# Patient Record
Sex: FEMALE | Race: WHITE | HISPANIC OR LATINO | Employment: OTHER | URBAN - METROPOLITAN AREA
[De-identification: names, ages, dates, MRNs, and addresses within clinical notes are randomized per-mention and may not be internally consistent; named-entity substitution may affect disease eponyms.]

---
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
## 2022-07-05 ENCOUNTER — APPOINTMENT (OUTPATIENT)
Dept: GENERAL RADIOLOGY | Facility: CLINIC | Age: 22
End: 2022-07-05
Attending: EMERGENCY MEDICINE
Payer: OTHER GOVERNMENT

## 2022-07-05 ENCOUNTER — APPOINTMENT (OUTPATIENT)
Dept: ULTRASOUND IMAGING | Facility: CLINIC | Age: 22
End: 2022-07-05
Attending: EMERGENCY MEDICINE
Payer: OTHER GOVERNMENT

## 2022-07-05 ENCOUNTER — HOSPITAL ENCOUNTER (EMERGENCY)
Facility: CLINIC | Age: 22
Discharge: HOME | End: 2022-07-05
Attending: EMERGENCY MEDICINE | Admitting: EMERGENCY MEDICINE
Payer: OTHER GOVERNMENT

## 2022-07-05 VITALS
RESPIRATION RATE: 20 BRPM | TEMPERATURE: 97.9 F | OXYGEN SATURATION: 99 % | SYSTOLIC BLOOD PRESSURE: 122 MMHG | HEIGHT: 63 IN | HEART RATE: 78 BPM | WEIGHT: 145 LBS | BODY MASS INDEX: 25.69 KG/M2 | DIASTOLIC BLOOD PRESSURE: 74 MMHG

## 2022-07-05 DIAGNOSIS — K80.50 BILIARY COLIC: ICD-10-CM

## 2022-07-05 LAB
ALBUMIN SERPL-MCNC: 3.3 G/DL (ref 3.4–5)
ALP SERPL-CCNC: 105 U/L (ref 40–150)
ALT SERPL W P-5'-P-CCNC: 115 U/L (ref 0–50)
ANION GAP SERPL CALCULATED.3IONS-SCNC: 7 MMOL/L (ref 3–14)
AST SERPL W P-5'-P-CCNC: 289 U/L (ref 0–45)
ATRIAL RATE - MUSE: 70 BPM
BASOPHILS # BLD AUTO: 0.1 10E3/UL (ref 0–0.2)
BASOPHILS NFR BLD AUTO: 1 %
BILIRUB SERPL-MCNC: 0.4 MG/DL (ref 0.2–1.3)
BUN SERPL-MCNC: 14 MG/DL (ref 7–30)
CALCIUM SERPL-MCNC: 9.1 MG/DL (ref 8.5–10.1)
CHLORIDE BLD-SCNC: 109 MMOL/L (ref 94–109)
CO2 SERPL-SCNC: 25 MMOL/L (ref 20–32)
CREAT SERPL-MCNC: 0.48 MG/DL (ref 0.52–1.04)
DIASTOLIC BLOOD PRESSURE - MUSE: NORMAL MMHG
EOSINOPHIL # BLD AUTO: 0.1 10E3/UL (ref 0–0.7)
EOSINOPHIL NFR BLD AUTO: 1 %
ERYTHROCYTE [DISTWIDTH] IN BLOOD BY AUTOMATED COUNT: 13.9 % (ref 10–15)
GFR SERPL CREATININE-BSD FRML MDRD: >90 ML/MIN/1.73M2
GLUCOSE BLD-MCNC: 121 MG/DL (ref 70–99)
HCG SERPL QL: NEGATIVE
HCT VFR BLD AUTO: 37.2 % (ref 35–47)
HGB BLD-MCNC: 12 G/DL (ref 11.7–15.7)
IMM GRANULOCYTES # BLD: 0 10E3/UL
IMM GRANULOCYTES NFR BLD: 0 %
INTERPRETATION ECG - MUSE: NORMAL
LIPASE SERPL-CCNC: 249 U/L (ref 73–393)
LYMPHOCYTES # BLD AUTO: 2.5 10E3/UL (ref 0.8–5.3)
LYMPHOCYTES NFR BLD AUTO: 28 %
MCH RBC QN AUTO: 28.6 PG (ref 26.5–33)
MCHC RBC AUTO-ENTMCNC: 32.3 G/DL (ref 31.5–36.5)
MCV RBC AUTO: 89 FL (ref 78–100)
MONOCYTES # BLD AUTO: 0.7 10E3/UL (ref 0–1.3)
MONOCYTES NFR BLD AUTO: 8 %
NEUTROPHILS # BLD AUTO: 5.7 10E3/UL (ref 1.6–8.3)
NEUTROPHILS NFR BLD AUTO: 62 %
NRBC # BLD AUTO: 0 10E3/UL
NRBC BLD AUTO-RTO: 0 /100
P AXIS - MUSE: 50 DEGREES
PLATELET # BLD AUTO: 213 10E3/UL (ref 150–450)
POTASSIUM BLD-SCNC: 3.7 MMOL/L (ref 3.4–5.3)
PR INTERVAL - MUSE: 154 MS
PROT SERPL-MCNC: 7.1 G/DL (ref 6.8–8.8)
QRS DURATION - MUSE: 74 MS
QT - MUSE: 392 MS
QTC - MUSE: 423 MS
R AXIS - MUSE: 44 DEGREES
RBC # BLD AUTO: 4.2 10E6/UL (ref 3.8–5.2)
SODIUM SERPL-SCNC: 141 MMOL/L (ref 133–144)
SYSTOLIC BLOOD PRESSURE - MUSE: NORMAL MMHG
T AXIS - MUSE: 32 DEGREES
TROPONIN I SERPL HS-MCNC: <3 NG/L
VENTRICULAR RATE- MUSE: 70 BPM
WBC # BLD AUTO: 9.1 10E3/UL (ref 4–11)

## 2022-07-05 PROCEDURE — 93005 ELECTROCARDIOGRAM TRACING: CPT

## 2022-07-05 PROCEDURE — 71046 X-RAY EXAM CHEST 2 VIEWS: CPT

## 2022-07-05 PROCEDURE — 36415 COLL VENOUS BLD VENIPUNCTURE: CPT | Performed by: EMERGENCY MEDICINE

## 2022-07-05 PROCEDURE — 83690 ASSAY OF LIPASE: CPT | Performed by: EMERGENCY MEDICINE

## 2022-07-05 PROCEDURE — 80053 COMPREHEN METABOLIC PANEL: CPT | Performed by: EMERGENCY MEDICINE

## 2022-07-05 PROCEDURE — 84703 CHORIONIC GONADOTROPIN ASSAY: CPT | Performed by: EMERGENCY MEDICINE

## 2022-07-05 PROCEDURE — 76705 ECHO EXAM OF ABDOMEN: CPT

## 2022-07-05 PROCEDURE — 84484 ASSAY OF TROPONIN QUANT: CPT | Performed by: EMERGENCY MEDICINE

## 2022-07-05 PROCEDURE — 99285 EMERGENCY DEPT VISIT HI MDM: CPT | Mod: 25

## 2022-07-05 PROCEDURE — 85025 COMPLETE CBC W/AUTO DIFF WBC: CPT | Performed by: EMERGENCY MEDICINE

## 2022-07-05 ASSESSMENT — ENCOUNTER SYMPTOMS
VOMITING: 0
CHILLS: 0
BACK PAIN: 1
SHORTNESS OF BREATH: 1
ABDOMINAL PAIN: 1
NAUSEA: 0
FEVER: 0
ARTHRALGIAS: 1
DYSURIA: 0
CONSTIPATION: 0
DIFFICULTY URINATING: 0
DIARRHEA: 0

## 2022-07-05 NOTE — DISCHARGE INSTRUCTIONS
Discharge Instructions  Biliary Colic    You have been seen today for biliary colic. Biliary colic is the pain that happens when gallstones block the normal flow of bile from the gallbladder.  It usually is a steady or crampy pain in the upper abdomen (belly), most often under the right side of the rib cage where the gallbladder is. Sometimes you get pain from the gallbladder in your back or shoulder. It is common to have nausea (sick to stomach) and vomiting (throwing up) with biliary colic.    Bile is a liquid the body makes to help with digesting fat.  It is made by the liver and stored in the gallbladder and released from the gall bladder when you eat fatty foods. Gallstones can form for a variety of reasons. Risk factors for gallstones include being female, having a family history of gallstones, being older, being pregnant or having been pregnant, having diabetes, having rapid weight loss, and others.      Once gallstones form, surgeons usually tell you to have your gallbladder removed. There is medicine that can dissolve gallstones, but it can be unpleasant to take, and gallstones tend to come back when you quit taking the medicine. Your regular provider can help decide on the right treatment for you, and may refer you to a surgeon to discuss whether surgery is right in your case.     Complications of gallstones include infection, jaundice, inflammation of the pancreas, and rupture of the gallbladder. One of these complications will happen to about one out of every four patients with gallstones over the next 10-20 years if they are not treated.       Generally, every Emergency Department visit should have a follow-up clinic visit with either a primary or a specialty clinic/provider. Please follow-up as instructed by your emergency provider today.    Return to the Emergency Department if you develop:  Fever greater than 100.5 F.   Persistent nausea and vomiting.  Pain that will not go away with the medicines  you were given here.  Yellow skin or eye color (jaundice).  Other new concerning symptoms.    What can I do to help myself?  Eat regular meals at least three times a day, to make the gallbladder empty before it gets too full.  Avoid fried or fatty foods.  Drink plenty of clear fluids.  Take over-the-counter or prescribed pain medications as recommended by your provider.      If you were given a prescription for medicine here today, be sure to read all of the information (including the package insert) that comes with your prescription.  This will include important information about the medicine, its side effects, and any warnings that you need to know about.  The pharmacist who fills the prescription can provide more information and answer questions you may have about the medicine.  If you have questions or concerns that the pharmacist cannot address, please call or return to the Emergency Department.     Remember that you can always come back to the Emergency Department if you are not able to see your regular provider in the amount of time listed above, if you get any new symptoms, or if there is anything that worries you.    Given that you have mild liver inflammation, I recommend taking ibuprofen as needed for pain rather than Tylenol.  Your liver processes Tylenol, and that is why I am recommending temporarily changing to ibuprofen.

## 2022-07-05 NOTE — ED TRIAGE NOTES
Triage Assessment     Row Name 07/05/22 9055       Triage Assessment (Adult)    Airway WDL WDL       Respiratory WDL    Respiratory WDL X;rhythm/pattern    Rhythm/Pattern, Respiratory shortness of breath  RR regular non-labored 99% RA denies cough       Skin Circulation/Temperature WDL    Skin Circulation/Temperature WDL WDL       Cardiac WDL    Cardiac WDL X  epigastric pain       Peripheral/Neurovascular WDL    Peripheral Neurovascular WDL WDL       Cognitive/Neuro/Behavioral WDL    Cognitive/Neuro/Behavioral WDL WDL

## 2022-07-05 NOTE — ED PROVIDER NOTES
"  History   Chief Complaint:  Abdominal Pain and Shortness of Breath       HPI   Johnnie Meyers is a 21 year old female with history of asthma who presents with abdominal pain and shortness of breath. The patient reports that she woke up this morning at 0100 with epigastric pain. She states that she has some associated shortness of breath, back pain and right shoulder pain. The patient reports that she took tylenol before she left the house which helped the back and shoulder pain. The patient denies any nausea, vomiting, fever, chills, constipation and diarrhea. She denies any chest pain, cough, leg swelling, dysuria, and difficulty urinating as well. The patient notes that she does have abnormal menstrual cycles but there is no chance she is pregnant.       Review of Systems   Constitutional: Negative for chills and fever.   Respiratory: Positive for shortness of breath.    Cardiovascular: Negative for chest pain and leg swelling.   Gastrointestinal: Positive for abdominal pain. Negative for constipation, diarrhea, nausea and vomiting.   Genitourinary: Negative for difficulty urinating and dysuria.   Musculoskeletal: Positive for arthralgias (right shoulder) and back pain.   All other systems reviewed and are negative.    Allergies:  No Known Drug Allergies    Medications:    Medications reviewed. No current medications.     Past Medical History:    Asthma     Past Surgical History:    Surgical history reviewed. No pertinent surgical history.    Family History:    Family history reviewed. No pertinent family history.      Social History:  The patient presents to the ED with her dad.  Alcohol Use: None  Tobacco Use: None  Drug Use: None    Physical Exam     Patient Vitals for the past 24 hrs:   BP Temp Temp src Pulse Resp SpO2 Height Weight   07/05/22 0503 129/85 97.9  F (36.6  C) Oral 72 18 99 % 1.6 m (5' 3\") 65.8 kg (145 lb)       Physical Exam  General: alert, lying comfortably on gurney  HENT: mucous membranes " moist  CV: regular rate, regular rhythm  Resp: normal effort, clear throughout, no crackles or wheezing  GI: abdomen soft, with negative Rowley sign.  No abdominal tenderness.  MSK: no bony tenderness  Skin: appropriately warm and dry  Extremities: no edema, calves non-tender  Neuro: alert, clear speech, oriented  Psych: normal mood and affect    Emergency Department Course   ECG  ECG results from 07/05/22   EKG 12-lead, tracing only     Value    Systolic Blood Pressure     Diastolic Blood Pressure     Ventricular Rate 70    Atrial Rate 70    NJ Interval 154    QRS Duration 74        QTc 423    P Axis 50    R AXIS 44    T Axis 32    Interpretation ECG      Sinus rhythm  Normal ECG  No previous ECGs available  Confirmed by GENERATED REPORT, COMPUTER ([id number]),  [place] ([id number]) on 7/5/2022 5:40:36 AM         Imaging:  Abdomen US, limited (RUQ only)   Final Result   IMPRESSION:   1.  Multiple gallstones. Negative sonographic Rowley sign. The common   duct is obscured for measurement, but there is no intrahepatic biliary   ductal dilatation.   2.  Mildly prominent size of the liver.      YOAV JETT MD            SYSTEM ID:  [id number]      XR Chest 2 Views   Final Result   IMPRESSION: Negative chest.        Report per radiology    Laboratory:  Labs Ordered and Resulted from Time of ED Arrival to Time of ED Departure   COMPREHENSIVE METABOLIC PANEL - Abnormal       Result Value    Sodium 141      Potassium 3.7      Chloride 109      Carbon Dioxide (CO2) 25      Anion Gap 7      Urea Nitrogen 14      Creatinine 0.48 (*)     Calcium 9.1      Glucose 121 (*)     Alkaline Phosphatase 105       (*)      (*)     Protein Total 7.1      Albumin 3.3 (*)     Bilirubin Total 0.4      GFR Estimate >90     LIPASE - Normal    Lipase 249     HCG QUALITATIVE PREGNANCY - Normal    hCG Serum Qualitative Negative     TROPONIN I - Normal    Troponin I High Sensitivity <3     CBC WITH PLATELETS AND DIFFERENTIAL     WBC Count 9.1      RBC Count 4.20      Hemoglobin 12.0      Hematocrit 37.2      MCV 89      MCH 28.6      MCHC 32.3      RDW 13.9      Platelet Count 213      % Neutrophils 62      % Lymphocytes 28      % Monocytes 8      % Eosinophils 1      % Basophils 1      % Immature Granulocytes 0      NRBCs per 100 WBC 0      Absolute Neutrophils 5.7      Absolute Lymphocytes 2.5      Absolute Monocytes 0.7      Absolute Eosinophils 0.1      Absolute Basophils 0.1      Absolute Immature Granulocytes 0.0      Absolute NRBCs 0.0          Procedures    Emergency Department Course:       Reviewed:  I reviewed nursing notes, vitals and past medical history    Assessments:  0656 I obtained history and examined the patient as noted above.     0829 I rechecked the patient and explained findings. She continues to be pain free.    Disposition:  The patient was discharged to home.     Impression & Plan     Medical Decision Making:    Johnnie Meyers is a 21 year old female who presented with abdominal pain and symptoms consistent with biliary colic.  On exam, she has a benign abdomen.  Ultrasound has confirmed the presence of gallstones.  There is no  evidence of cholecystitis, choledocholithiasis, gallstone pancreatitis, or ascending cholangitis.  She has a mild transaminitis, likely related to transient CBD obstruction and possible passage of a stone.  There is no chest pain or ACS equivalent symptom to suggest the patient s symptoms are cardiac in nature. There is no lower abdominal tenderness to suggest appendicitis, diverticulitis, or other acute process. On recheck, she is feeling significantly better. Pain is controlled, and she is tolerating POs. I recommended avoiding fatty foods, and to return to the ED immediately for uncontrolled pain, vomiting, fever, or any other concerning symptoms. I discussed the natural history of symptomatic cholelithiasis, and I recommended outpatient follow up with general surgery in 3-5 days for  further consultation and care.  Analgesics and anti-emetics have been prescribed.    Diagnosis:    ICD-10-CM    1. Biliary colic  K80.50        Discharge Medications:  There are no discharge medications for this patient.      Scribe Disclosure:  I, Inés Ellison, am serving as a scribe at 6:55 AM on 7/5/2022 to document services personally performed by Catherine Manley MD based on my observations and the provider's statements to me.              Catherine Manley MD  07/06/22 0539

## 2022-07-05 NOTE — ED TRIAGE NOTES
P[t states awakened at 0100 by severe epigastric pain denies any N/V. States some SOB.      Triage Assessment     Row Name 07/05/22 0500       Triage Assessment (Adult)    Airway WDL WDL       Respiratory WDL    Respiratory WDL X;rhythm/pattern    Rhythm/Pattern, Respiratory shortness of breath  RR regular non-labored 99% RA denies cough       Skin Circulation/Temperature WDL    Skin Circulation/Temperature WDL WDL       Cardiac WDL    Cardiac WDL X  epigastric pain       Peripheral/Neurovascular WDL    Peripheral Neurovascular WDL WDL       Cognitive/Neuro/Behavioral WDL    Cognitive/Neuro/Behavioral WDL WDL

## 2022-07-10 ENCOUNTER — HEALTH MAINTENANCE LETTER (OUTPATIENT)
Age: 22
End: 2022-07-10

## 2022-09-11 ENCOUNTER — HEALTH MAINTENANCE LETTER (OUTPATIENT)
Age: 22
End: 2022-09-11

## 2023-07-23 ENCOUNTER — HEALTH MAINTENANCE LETTER (OUTPATIENT)
Age: 23
End: 2023-07-23

## 2024-09-15 ENCOUNTER — HEALTH MAINTENANCE LETTER (OUTPATIENT)
Age: 24
End: 2024-09-15

## 2025-03-22 ENCOUNTER — HOSPITAL ENCOUNTER (EMERGENCY)
Facility: CLINIC | Age: 25
Discharge: HOME | End: 2025-03-23
Attending: EMERGENCY MEDICINE | Admitting: EMERGENCY MEDICINE
Payer: COMMERCIAL

## 2025-03-22 ENCOUNTER — APPOINTMENT (OUTPATIENT)
Dept: CT IMAGING | Facility: CLINIC | Age: 25
End: 2025-03-22
Attending: EMERGENCY MEDICINE
Payer: COMMERCIAL

## 2025-03-22 ENCOUNTER — APPOINTMENT (OUTPATIENT)
Dept: GENERAL RADIOLOGY | Facility: CLINIC | Age: 25
End: 2025-03-22
Attending: EMERGENCY MEDICINE
Payer: COMMERCIAL

## 2025-03-22 DIAGNOSIS — F32.1 CURRENT MODERATE EPISODE OF MAJOR DEPRESSIVE DISORDER WITHOUT PRIOR EPISODE (H): ICD-10-CM

## 2025-03-22 DIAGNOSIS — F10.920 ALCOHOLIC INTOXICATION WITHOUT COMPLICATION: ICD-10-CM

## 2025-03-22 LAB
ALBUMIN SERPL BCG-MCNC: 4.4 G/DL (ref 3.5–5.2)
ALP SERPL-CCNC: 145 U/L (ref 40–150)
ALT SERPL W P-5'-P-CCNC: 46 U/L (ref 0–50)
ANION GAP SERPL CALCULATED.3IONS-SCNC: 14 MMOL/L (ref 7–15)
APAP SERPL-MCNC: <5 UG/ML (ref 10–30)
AST SERPL W P-5'-P-CCNC: 37 U/L (ref 0–45)
BASE EXCESS BLDV CALC-SCNC: -7 MMOL/L (ref -3–3)
BILIRUB SERPL-MCNC: <0.2 MG/DL
BUN SERPL-MCNC: 7.5 MG/DL (ref 6–20)
CALCIUM SERPL-MCNC: 8.6 MG/DL (ref 8.8–10.4)
CHLORIDE SERPL-SCNC: 106 MMOL/L (ref 98–107)
CREAT SERPL-MCNC: 0.55 MG/DL (ref 0.51–0.95)
EGFRCR SERPLBLD CKD-EPI 2021: >90 ML/MIN/1.73M2
ETHANOL SERPL-MCNC: 0.22 G/DL
ETHANOL SERPL-MCNC: 0.23 G/DL
GLUCOSE SERPL-MCNC: 163 MG/DL (ref 70–99)
HCG SERPL QL: NEGATIVE
HCO3 BLDV-SCNC: 19 MMOL/L (ref 21–28)
HCO3 SERPL-SCNC: 20 MMOL/L (ref 22–29)
LACTATE BLD-SCNC: 3.8 MMOL/L (ref 0.7–2)
LIPASE SERPL-CCNC: 47 U/L (ref 13–60)
PCO2 BLDV: 41 MM HG (ref 40–50)
PH BLDV: 7.28 [PH] (ref 7.32–7.43)
PO2 BLDV: 46 MM HG (ref 25–47)
POTASSIUM SERPL-SCNC: 3 MMOL/L (ref 3.4–5.3)
PROT SERPL-MCNC: 7.7 G/DL (ref 6.4–8.3)
SALICYLATES SERPL-MCNC: <0.3 MG/DL
SAO2 % BLDV: 77 % (ref 70–75)
SODIUM SERPL-SCNC: 140 MMOL/L (ref 135–145)

## 2025-03-22 PROCEDURE — 71045 X-RAY EXAM CHEST 1 VIEW: CPT

## 2025-03-22 PROCEDURE — 83690 ASSAY OF LIPASE: CPT | Performed by: EMERGENCY MEDICINE

## 2025-03-22 PROCEDURE — 82803 BLOOD GASES ANY COMBINATION: CPT

## 2025-03-22 PROCEDURE — 70450 CT HEAD/BRAIN W/O DYE: CPT

## 2025-03-22 PROCEDURE — 80307 DRUG TEST PRSMV CHEM ANLYZR: CPT | Performed by: EMERGENCY MEDICINE

## 2025-03-22 PROCEDURE — 96361 HYDRATE IV INFUSION ADD-ON: CPT

## 2025-03-22 PROCEDURE — 99285 EMERGENCY DEPT VISIT HI MDM: CPT | Mod: 25

## 2025-03-22 PROCEDURE — 85025 COMPLETE CBC W/AUTO DIFF WBC: CPT | Performed by: EMERGENCY MEDICINE

## 2025-03-22 PROCEDURE — 93005 ELECTROCARDIOGRAM TRACING: CPT

## 2025-03-22 PROCEDURE — 80143 DRUG ASSAY ACETAMINOPHEN: CPT | Performed by: EMERGENCY MEDICINE

## 2025-03-22 PROCEDURE — 36415 COLL VENOUS BLD VENIPUNCTURE: CPT | Performed by: EMERGENCY MEDICINE

## 2025-03-22 PROCEDURE — 96374 THER/PROPH/DIAG INJ IV PUSH: CPT

## 2025-03-22 PROCEDURE — 84703 CHORIONIC GONADOTROPIN ASSAY: CPT | Performed by: EMERGENCY MEDICINE

## 2025-03-22 PROCEDURE — 96375 TX/PRO/DX INJ NEW DRUG ADDON: CPT

## 2025-03-22 PROCEDURE — 83605 ASSAY OF LACTIC ACID: CPT

## 2025-03-22 PROCEDURE — 80053 COMPREHEN METABOLIC PANEL: CPT | Performed by: EMERGENCY MEDICINE

## 2025-03-22 PROCEDURE — 250N000011 HC RX IP 250 OP 636: Performed by: EMERGENCY MEDICINE

## 2025-03-22 PROCEDURE — 258N000003 HC RX IP 258 OP 636: Performed by: EMERGENCY MEDICINE

## 2025-03-22 PROCEDURE — 82077 ASSAY SPEC XCP UR&BREATH IA: CPT | Performed by: EMERGENCY MEDICINE

## 2025-03-22 PROCEDURE — 80179 DRUG ASSAY SALICYLATE: CPT | Performed by: EMERGENCY MEDICINE

## 2025-03-22 RX ORDER — NALOXONE HYDROCHLORIDE 1 MG/ML
1 INJECTION INTRAMUSCULAR; INTRAVENOUS; SUBCUTANEOUS ONCE
Status: COMPLETED | OUTPATIENT
Start: 2025-03-22 | End: 2025-03-22

## 2025-03-22 RX ORDER — ONDANSETRON 2 MG/ML
4 INJECTION INTRAMUSCULAR; INTRAVENOUS ONCE
Status: COMPLETED | OUTPATIENT
Start: 2025-03-22 | End: 2025-03-22

## 2025-03-22 RX ADMIN — NALOXONE HYDROCHLORIDE 1 MG: 1 INJECTION PARENTERAL at 22:53

## 2025-03-22 RX ADMIN — SODIUM CHLORIDE, SODIUM LACTATE, POTASSIUM CHLORIDE, AND CALCIUM CHLORIDE 1000 ML: .6; .31; .03; .02 INJECTION, SOLUTION INTRAVENOUS at 22:57

## 2025-03-22 RX ADMIN — ONDANSETRON 4 MG: 2 INJECTION, SOLUTION INTRAMUSCULAR; INTRAVENOUS at 22:51

## 2025-03-22 RX ADMIN — NALOXONE HYDROCHLORIDE 1 MG: 1 INJECTION PARENTERAL at 22:50

## 2025-03-22 ASSESSMENT — ACTIVITIES OF DAILY LIVING (ADL): ADLS_ACUITY_SCORE: 41

## 2025-03-22 NOTE — LETTER
Johnnie Meyers was seen and treated in our emergency department on 3/22/2025.  She may return to work on 03/25/2025.       If you have any questions or concerns, please don't hesitate to call.      Xiomy Pereira, APRN CNP

## 2025-03-23 VITALS
RESPIRATION RATE: 18 BRPM | OXYGEN SATURATION: 98 % | HEART RATE: 102 BPM | WEIGHT: 193 LBS | DIASTOLIC BLOOD PRESSURE: 85 MMHG | TEMPERATURE: 98.1 F | HEIGHT: 63 IN | SYSTOLIC BLOOD PRESSURE: 132 MMHG | BODY MASS INDEX: 34.2 KG/M2

## 2025-03-23 PROBLEM — F10.929 ALCOHOL INTOXICATION: Status: ACTIVE | Noted: 2025-03-23

## 2025-03-23 PROBLEM — F33.9 MAJOR DEPRESSION, RECURRENT: Status: ACTIVE | Noted: 2025-03-23

## 2025-03-23 PROBLEM — F41.1 GAD (GENERALIZED ANXIETY DISORDER): Status: ACTIVE | Noted: 2025-03-23

## 2025-03-23 LAB
AMPHETAMINES UR QL SCN: NORMAL
ATRIAL RATE - MUSE: 93 BPM
BARBITURATES UR QL SCN: NORMAL
BENZODIAZ UR QL SCN: NORMAL
BZE UR QL SCN: NORMAL
CANNABINOIDS UR QL SCN: NORMAL
DIASTOLIC BLOOD PRESSURE - MUSE: NORMAL MMHG
FENTANYL UR QL: NORMAL
INTERPRETATION ECG - MUSE: NORMAL
LACTATE SERPL-SCNC: 2.2 MMOL/L (ref 0.7–2)
OPIATES UR QL SCN: NORMAL
P AXIS - MUSE: 42 DEGREES
PCP QUAL URINE (ROCHE): NORMAL
PR INTERVAL - MUSE: 172 MS
QRS DURATION - MUSE: 80 MS
QT - MUSE: 362 MS
QTC - MUSE: 450 MS
R AXIS - MUSE: 26 DEGREES
SYSTOLIC BLOOD PRESSURE - MUSE: NORMAL MMHG
T AXIS - MUSE: 13 DEGREES
VENTRICULAR RATE- MUSE: 93 BPM

## 2025-03-23 PROCEDURE — 96361 HYDRATE IV INFUSION ADD-ON: CPT

## 2025-03-23 PROCEDURE — 99244 OFF/OP CNSLTJ NEW/EST MOD 40: CPT | Performed by: NURSE PRACTITIONER

## 2025-03-23 PROCEDURE — 250N000011 HC RX IP 250 OP 636: Performed by: NURSE PRACTITIONER

## 2025-03-23 PROCEDURE — 36415 COLL VENOUS BLD VENIPUNCTURE: CPT

## 2025-03-23 PROCEDURE — 83605 ASSAY OF LACTIC ACID: CPT

## 2025-03-23 RX ORDER — ONDANSETRON 4 MG/1
4 TABLET, ORALLY DISINTEGRATING ORAL ONCE
Status: COMPLETED | OUTPATIENT
Start: 2025-03-23 | End: 2025-03-23

## 2025-03-23 RX ADMIN — ONDANSETRON 4 MG: 4 TABLET, ORALLY DISINTEGRATING ORAL at 12:39

## 2025-03-23 ASSESSMENT — ACTIVITIES OF DAILY LIVING (ADL)
ADLS_ACUITY_SCORE: 41

## 2025-03-23 NOTE — PROGRESS NOTES
Pt resting with TV on, her nausea has subsided. She called and spoke with her Dad, and met with the  LMHP. Waiting to see Provider. Remains flat, isolates to recliner with no complaints or sign of distress.
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
## 2025-03-23 NOTE — PROGRESS NOTES
"Pt comes to [facility] voluntarily, parents were at bedside in the ED, they went home and took patients belongings with them. Search was complete and she took a shower after intake, changing into scrubs. Pt is very flat and withdrawn,tearful at times. She spoke English well and was able to understand. She does not recall much from last night, she does report drinking ETOH, \"vodka I think.\" She reports this was a one time thing, as she does not normally drink or use drugs. When asked if this was an attempt to harm herself, she answered \"I don't know.\" She denied SI or a plan or intent, but she is slow to respond when she answers. She denies any past attempts, she did report cutting her thighs, which have some superficial lacerations. She denies HI or hallucinations, she takes no medications. She lives with her Parents, and reports feeling safe. She has a job and attends school. She does not elaborate on stressors/triggers. She does agree she is sad and is having some stress. She denied pain, endorse some nausea and dizziness intermittently. Vitals are stable. She is pleasant and cooperative. Breakfast is at her table side, she is asleep at this time.   "

## 2025-03-23 NOTE — ED NOTES
Patient agrees to discharge plan. Discharge instructions reviewed with patient including follow-up care plan. Medications: none ordered. Reviewed safety plan and outpatient resources. Denies SI and HI. All belongings that were brought into the hospital have been returned to patient. Escorted off the unit at 1645 accompanied by [facility] staff. Discharged to home via ride from family.

## 2025-03-23 NOTE — ED NOTES
[facility]  ED to [facility] Checklist:      Goal for [facility]: Depression management, Substance use,  Referral and resources, and Other: Self injurious behavior.    Current Behavior: Flat Affect, Quiet, Sad, Withdrawn, Calm, and Cooperative    Safety Concerns: None     Legal Hold Status: Voluntary     Medically Cleared by ED provider: Yes, Blood glucose checked , Vital signs stable, and IV removed    Patient Therapeutically Searched: Therapeutic search by ED staff (strings, belts, shoes, pockets, electronics, etc.)    Belongings: Remain with patient    Independent Ambulation at Baseline: Yes/No: Yes    Participates in Care/Conversation: Yes/No: Yes    Patient Informed about [facility]: Yes/No: Yes    DEC: Ordered and pending    Patient Ready to be Transferred to [facility]? Yes/No: Yes

## 2025-03-23 NOTE — ED PROVIDER NOTES
Emergency Department Note      History of Present Illness     Chief Complaint   Altered Mental Status      HPI   Johnnie Meyers is a 24 year old female who presents to the ED via EMS for evaluation of altered mental status. EMS reports that father came home and found patient unresponsive with several piles of vomit on the floor around her, as well as vomit in her mouth. She vomited several more times en route which appeared to clear the airway. Her last known well was around 1600 today. She was vitally okay for EMS. No history of alcohol or drug abuse. Patient's father says that he did not see drugs or alcohol around Johnnie when he found her, and that she does not use drugs or drink. He endorses that she may be dealing with some mental health issues.    Independent Historian   EMS as detailed above.    Review of External Notes   None    Past Medical History     Medical History and Problem List   Asthma     Medications   The patient is not currently taking any regular medications.      Surgical History   No past surgical history on file.    Physical Exam     Patient Vitals for the past 24 hrs:   BP Temp Temp src Pulse Resp SpO2   03/23/25 0707 (!) 134/102 -- -- 93 -- --   03/23/25 0400 99/64 -- -- 97 18 93 %   03/23/25 0330 92/59 -- -- 91 14 96 %   03/23/25 0300 99/67 -- -- 83 14 96 %   03/23/25 0230 93/64 -- -- 84 15 97 %   03/23/25 0200 99/70 98.1  F (36.7  C) Temporal 87 15 95 %   03/23/25 0145 -- -- -- 82 16 98 %   03/23/25 0130 105/64 -- -- 89 17 97 %   03/23/25 0022 -- -- -- 91 18 99 %   03/23/25 0017 127/74 -- -- 92 17 98 %   03/23/25 0012 -- -- -- 97 17 99 %   03/23/25 0007 -- -- -- 98 17 100 %   03/23/25 0002 (!) 136/99 -- -- 101 24 100 %   03/22/25 2346 -- -- -- 116 27 97 %   03/22/25 2345 (!) 127/114 -- -- 113 18 99 %   03/22/25 2300 121/81 -- -- 95 19 97 %   03/22/25 2257 -- -- -- 101 21 99 %   03/22/25 2252 (!) 122/96 -- -- 88 (!) 8 98 %     Physical Exam  Superficial linear lacerations to the right  anterior thigh    Diagnostics     Lab Results   Labs Ordered and Resulted from Time of ED Arrival to Time of ED Departure   COMPREHENSIVE METABOLIC PANEL - Abnormal       Result Value    Sodium 140      Potassium 3.0 (*)     Carbon Dioxide (CO2) 20 (*)     Anion Gap 14      Urea Nitrogen 7.5      Creatinine 0.55      GFR Estimate >90      Calcium 8.6 (*)     Chloride 106      Glucose 163 (*)     Alkaline Phosphatase 145      AST 37      ALT 46      Protein Total 7.7      Albumin 4.4      Bilirubin Total <0.2     ETHYL ALCOHOL LEVEL - Abnormal    Alcohol ethyl 0.23 (*)    ACETAMINOPHEN LEVEL - Abnormal    Acetaminophen <5.0 (*)    CBC WITH PLATELETS AND DIFFERENTIAL - Abnormal    WBC Count 13.4 (*)     RBC Count 4.90      Hemoglobin 11.5 (*)     Hematocrit 37.5      MCV 77 (*)     MCH 23.5 (*)     MCHC 30.7 (*)     RDW 15.1 (*)     Platelet Count 343     ISTAT GASES LACTATE VENOUS POCT - Abnormal    Lactic Acid POCT 3.8 (*)     Bicarbonate Venous POCT 19 (*)     O2 Sat, Venous POCT 77 (*)     pCO2 Venous POCT 41      pH Venous POCT 7.28 (*)     pO2 Venous POCT 46      Base Excess/Deficit (+/-) POCT -7.0 (*)    ETHYL ALCOHOL LEVEL - Abnormal    Alcohol ethyl 0.22 (*)    RBC AND PLATELET MORPHOLOGY - Abnormal    RBC Morphology Confirmed RBC Indices      Platelet Assessment        Value: Automated Count Confirmed. Platelet morphology is normal.    Acanthocytes Slight (*)     Elliptocytes Slight (*)     RBC Fragments Slight (*)    LACTIC ACID WHOLE BLOOD - Abnormal    Lactic Acid 2.2 (*)    LIPASE - Normal    Lipase 47     HCG QUALITATIVE PREGNANCY - Normal    hCG Serum Qualitative Negative     SALICYLATE LEVEL - Normal    Salicylate <0.3     URINE DRUG SCREEN PANEL - Normal    Amphetamines Urine Screen Negative      Barbituates Urine Screen Negative      Benzodiazepine Urine Screen Negative      Cannabinoids Urine Screen Negative      Cocaine Urine Screen Negative      Fentanyl Qual Urine Screen Negative      Opiates  Urine Screen Negative      PCP Urine Screen Negative         Imaging   CT Head w/o Contrast   Final Result   IMPRESSION:   1.  Normal head CT.      XR Chest Port 1 View   Final Result   IMPRESSION: Patchy opacity at the left lung base, suspicious for aspiration pneumonia in setting of reported vomiting. Trace left pleural effusion. Right lung is clear. Normal heart size without pulmonary vascular congestion. No pneumothorax. No acute    osseous abnormality.          EKG   ECG taken at 1052, ECG read at 1052  Normal sinus rhythm   Rate 93 bpm. AZ interval 172 ms. QRS duration 80 ms. QT/QTc 362/450 ms. P-R-T axes 42 26 13.    Independent Interpretation   On my independent interpretation of head CT there is no large ICH noted  On my independent interpretation of CXR there is no pneumothorax      ED Course      Medications Administered   Medications   naloxone (NARCAN) injection 1 mg (1 mg Intravenous $Given 3/22/25 2250)   ondansetron (ZOFRAN) injection 4 mg (4 mg Intravenous $Given 3/22/25 2251)   naloxone (NARCAN) injection 1 mg (1 mg Intravenous $Given 3/22/25 2253)   lactated ringers BOLUS 1,000 mL (0 mLs Intravenous Stopped 3/23/25 0110)       Procedures   Procedures     Discussion of Management   DEC consulted    ED Course   ED Course as of 03/23/25 0713   Sat Mar 22, 2025   2248 I obtained the history and performed the examination as described.    Sun Mar 23, 2025   0000 I rechecked and updated the patient.     0120 I rechecked and updated the patient.             Medical Decision Making / Diagnosis     CMS Diagnoses: IV Antibiotics given and/or elevated Lactate of 3.8 and no sepsis note found - Delete this reminder and enter the sepsis note or '.edcms' before signing chart.>>>Lactic acid elevated due to altered mental status and intoxication. At this time there are no signs of sepsis or septic shock    MIPS       None    MDM   Johnnie Meyers is a 24 year old female presents due to altered mental status and  vomiting.  Father had come home and found the patient on the ground with vomit around her and unresponsive and called 911.  On arrival to the ER, the patient was minimally responsive, but would move about and respond to a sternal rub.  She could not provide any history.  She did smell of alcohol.  On physical exam it was noted that she had very superficial linear lacerations to the right thigh consistent with self cutting.  I did try giving Narcan without response, and the clinical picture appears less consistent with an opiate overdose.  EKG, blood work, chest x-ray, and CT scan were obtained.  Workup was with overall reassuring although the patient's alcohol level did come back elevated, and this would be consistent with her presentation.  Patient's mentation did improve over time and after period of observation she was alert and oriented and ambulatory.  In speaking with the patient alone she did endorse alcohol use and self cutting.  She did report that she has been going through quite a bit of stress recently, although did not want to give significant details.  She denied any other ingestions.  Patient was agreeable to go to the empath unit voluntarily for further psychiatric evaluation and stabilization.  Patient was medically cleared for the EmPATH unit.  Patient's initial chest x-ray did show a question of possible aspiration, but patient does not have any signs of infection at this time and this would be treated as a pneumonitis unless she develops infectious type symptoms.    Disposition   The patient was transferred to [facility].     Diagnosis     ICD-10-CM    1. Alcoholic intoxication without complication  F10.920       2. Deliberate self-cutting  Z72.89       3. Depression, unspecified depression type  F32.A            Scribe Disclosure:  I, Teja Aggarwal, am serving as a scribe at 10:55 PM on 3/22/2025 to document services personally performed by Nasir Valdes MD based on my observations and the  provider's statements to me.        Nasir Valdes MD  03/23/25 0756

## 2025-03-23 NOTE — PROGRESS NOTES
Pt ate a small amount of lunch, she reports some nausea. Zofran 4 mg ODT given. She has been pleasant, calm and cooperative.

## 2025-03-23 NOTE — ED PROVIDER NOTES
"[facility] Unit - Psychiatric Consultation  [facility] Emergency Department    Johnnie Meyers MRN: [id number]   Age: 24 year old YOB: 2000     History     Chief Complaint   Patient presents with    Altered Mental Status     HPI  Johnnie Meyers is a 24 year old female with history notable for trauma who presents to the ED intoxicated and altered mental status. Her father found her unresponsive with vomit around her. BAL 0.23 upon arrival to ED. Patient was evaluated by the ED provider, who medically cleared patient to transfer to [facility] for psychiatric assessment, this is reviewed along with all pertinent labs and tests performed. At time of psychiatric evaluation today, 3/23/25 she's been in the ED setting for approximately 17 hours.    On examination, patient is deemed a reliable historian. She is resting in the recliner. She denies any physical symptoms secondary to last nights alcohol use. She states she regrets what she did and the worry she caused her parents. She identifies her parents as a source of emotional support, however she does not want to feel like a burden. She is not working with a therapist or psychiatrist. She mentions past trauma related to her father's past alcohol use and working with a therapist in high school. She describes not knowing \"what's wrong with me\" and \"I don't know why I feel the way I do.\" She talks about mood swings, low frustration tolerance, feelings of emptiness, difficulty focusing/concentrating, fatigue, feeling of guilt and changes in sleep pattern. She denies past diagnosis of major depressive disorder although she reports having these symptoms for a couple years. She states it was a \"build up\" that caused her to drink last night. She denies it being a suicide attempt. She is under increased stress with school, work, and  duties. She has a history of deliberate cutting, last performed in December, She struggles with identifying any healthy coping skills. " "She denies any symptoms of psychosis or suicidal ideation. She is seeking to return home. She's spoken to her mother who is in agreement with the plan.    Past Medical History  Past Medical History:   Diagnosis Date    Uncomplicated asthma      No past surgical history on file.  No current outpatient medications on file.    No Known Allergies  Family History  No family history on file.  Social History   Social History     Tobacco Use    Smoking status: Never    Smokeless tobacco: Never   Substance Use Topics    Alcohol use: No    Drug use: No          Review of Systems  A medically appropriate review of systems was performed with pertinent positives and negatives noted in the HPI, and all other systems negative.    Physical Examination   BP: (!) 122/96  Pulse: 88  Temp: 98.1  F (36.7  C)  Resp: (!) 8  Height: 160 cm (5' 3\")  Weight: 87.5 kg (193 lb)  SpO2: 98 %    Physical Exam  General: Appears stated age.   Neuro: Alert and fully oriented. Extremities appear to demonstrate normal strength on visual inspection.   Integumentary/Skin: no rash visualized, normal color    Psychiatric Examination   Appearance: awake, alert  Attitude:  cooperative  Eye Contact:  good  Mood:  sad   Affect:  mood congruent  Speech:  clear, coherent  Psychomotor Behavior:  no evidence of tardive dyskinesia, dystonia, or tics and intact station, gait and muscle tone  Thought Process:  logical, linear, and goal oriented  Associations:  no loose associations  Thought Content:  no evidence of suicidal ideation or homicidal ideation and no evidence of psychotic thought  Insight:  good  Judgement:  intact  Oriented to:  time, person, and place  Attention Span and Concentration:  intact  Recent and Remote Memory:  intact  Language: able to name/identify objects without impairment  Fund of Knowledge: intact with awareness of current and past events    ED Course     ED Course as of 03/23/25 1558   Sat Mar 22, 2025   0578 I obtained the history and " performed the examination as described.    Sun Mar 23, 2025   0000 I rechecked and updated the patient.     0120 I rechecked and updated the patient.         Labs Ordered and Resulted from Time of ED Arrival to Time of ED Departure   COMPREHENSIVE METABOLIC PANEL - Abnormal       Result Value    Sodium 140      Potassium 3.0 (*)     Carbon Dioxide (CO2) 20 (*)     Anion Gap 14      Urea Nitrogen 7.5      Creatinine 0.55      GFR Estimate >90      Calcium 8.6 (*)     Chloride 106      Glucose 163 (*)     Alkaline Phosphatase 145      AST 37      ALT 46      Protein Total 7.7      Albumin 4.4      Bilirubin Total <0.2     ETHYL ALCOHOL LEVEL - Abnormal    Alcohol ethyl 0.23 (*)    ACETAMINOPHEN LEVEL - Abnormal    Acetaminophen <5.0 (*)    CBC WITH PLATELETS AND DIFFERENTIAL - Abnormal    WBC Count 13.4 (*)     RBC Count 4.90      Hemoglobin 11.5 (*)     Hematocrit 37.5      MCV 77 (*)     MCH 23.5 (*)     MCHC 30.7 (*)     RDW 15.1 (*)     Platelet Count 343     ISTAT GASES LACTATE VENOUS POCT - Abnormal    Lactic Acid POCT 3.8 (*)     Bicarbonate Venous POCT 19 (*)     O2 Sat, Venous POCT 77 (*)     pCO2 Venous POCT 41      pH Venous POCT 7.28 (*)     pO2 Venous POCT 46      Base Excess/Deficit (+/-) POCT -7.0 (*)    ETHYL ALCOHOL LEVEL - Abnormal    Alcohol ethyl 0.22 (*)    RBC AND PLATELET MORPHOLOGY - Abnormal    RBC Morphology Confirmed RBC Indices      Platelet Assessment        Value: Automated Count Confirmed. Platelet morphology is normal.    Acanthocytes Slight (*)     Elliptocytes Slight (*)     RBC Fragments Slight (*)    LACTIC ACID WHOLE BLOOD - Abnormal    Lactic Acid 2.2 (*)    LIPASE - Normal    Lipase 47     HCG QUALITATIVE PREGNANCY - Normal    hCG Serum Qualitative Negative     SALICYLATE LEVEL - Normal    Salicylate <0.3     URINE DRUG SCREEN PANEL - Normal    Amphetamines Urine Screen Negative      Barbituates Urine Screen Negative      Benzodiazepine Urine Screen Negative      Cannabinoids  "Urine Screen Negative      Cocaine Urine Screen Negative      Fentanyl Qual Urine Screen Negative      Opiates Urine Screen Negative      PCP Urine Screen Negative         Assessments & Plan (with Medical Decision Making)   Patient presenting with AMS in the context of excessive alcohol consumption, which is out of character for patient. She denies this being a suicide attempt. She did not have an agenda when she started drinking. She states she's been feeling depressed with mood instability for over a year. She meets criteria for major depressive disorder and would be an ideal candidate for therapy and antidepressant. Patient states she's \"not ready\" to start an selective serotonin reuptake inhibitor at this time, but is open to establishing psychiatric care with a provider in case she changes her mind once she leaves. She is open to therapy.       Nursing notes reviewed noting no acute issues.     I have reviewed the assessment completed by the [facility].     After a period of working with the treatment team on the [facility] unit, the patient's mental state improved to allow a safe transition to outpatient care. After counseling on the diagnosis, work-up, and treatment plan, the patient was discharged. Close follow-up with a psychiatrist and/or therapist was recommended and community psychiatric resources were provided. Patient is to return to the ED if any urgent or potentially life-threatening concerns.     At the time of discharge, the patient's acute suicide risk was determined to be low due to the following factors: Reduction in the intensity of mood/anxiety symptoms that preceded the admission, denial of suicidal thoughts, denies feeling helpless or hopeless, not currently under the influence of alcohol or illicit substances, denies experiencing command hallucinations, no immediate access to firearms. The patient's acute risk could be higher if noncompliant with their treatment plan, medications, follow-up " appointments or using illicit substances or alcohol. Protective factors include: social supports, stable housing, employment.    Preliminary diagnosis:    ICD-10-CM    1. Alcoholic intoxication without complication  F10.920       2. Current moderate episode of major depressive disorder without prior episode (H)  F32.1            Treatment Plan:  -Discharge home with safety plan.  -Appointment made for therapy on 3/27/25.  -Telepsychiatry appointment on 3/28/25 to further discuss medication management.  -Recommended starting Zoloft to target mood.  -Educated on MDD and recommended treatment.  -Discussed importance of establishing support outside of family.  -Provided a note to excuse her from work tomorrow.  -Discussed alcohol use, which does not seem to be an issue for patient. Educated on the depressive mechanisms related to consumption.    --  CARISSA Banuelos CNP   [facility] EMERGENCY DEPT  EmPATH Unit       Xiomy Pereira APRN CNP  03/23/25 [id number]

## 2025-03-23 NOTE — ED NOTES
Pt awake and up to the bathroom w/ SBA.  Pt ambulated with steady gait. Denied any dizziness.  Offered water, pt declined. Disposable scrubs offered and socks.  Father at bedside.    MD updated.  Madeleine Morley RN,.......................................... 3/23/2025   5:00 AM

## 2025-03-23 NOTE — DISCHARGE INSTRUCTIONS
Your Upcoming Appointment:    Type: Therapy - (In-Person)  Date: Thursday, 3/27/2025    Time: 5:00 pm - 6:00 pm  Provider: Cassia Doran  MS  LMFT  Location: [facility], [address] [place], [place] [address]  Phone: [phone]  Patient instructions: To reduce no shows, we ask that patients call our office at [phone] and confirm their appointment and leave their email. We make effort to reach each client, but if we cannot reach them or they do not confirm appointment, the appointment will be removed. Please ask patients to leave a voicemail with their information.    -----    Type: Telepsychiatry  Date: Friday, 3/28/2025    Time: 5:00 pm - 6:00 pm  Provider: Todd Maldonado DNP  CNP,PMHNP,RN  Location: [facility], [address] [place] [address] [address], [place] [id number]  Phone: [phone]    Patient instructions  When you make an appointment expect a call and email from our office team.

## 2025-03-23 NOTE — ED TRIAGE NOTES
Patient found at home by father, she was minimally responsible in a puddle of vomit. LKW was 6 hour ago. No meds given by EMS.      Triage Assessment (Adult)       Row Name 03/22/25 7973          Triage Assessment    Airway WDL X;all        Cardiac WDL    Cardiac WDL WDL

## 2025-03-24 ENCOUNTER — TELEPHONE (OUTPATIENT)
Dept: BEHAVIORAL HEALTH | Facility: CLINIC | Age: 25
End: 2025-03-24
Payer: COMMERCIAL

## 2025-03-24 ENCOUNTER — DOCUMENTATION ONLY (OUTPATIENT)
Dept: OTHER | Facility: CLINIC | Age: 25
End: 2025-03-24
Payer: COMMERCIAL

## 2025-03-24 LAB
ACANTHOCYTES BLD QL SMEAR: SLIGHT
BASOPHILS # BLD AUTO: 0.1 10E3/UL (ref 0–0.2)
BASOPHILS NFR BLD AUTO: 1 %
ELLIPTOCYTES BLD QL SMEAR: SLIGHT
EOSINOPHIL # BLD AUTO: 0.2 10E3/UL (ref 0–0.7)
EOSINOPHIL NFR BLD AUTO: 1 %
ERYTHROCYTE [DISTWIDTH] IN BLOOD BY AUTOMATED COUNT: 15.1 % (ref 10–15)
FRAGMENTS BLD QL SMEAR: SLIGHT
HCT VFR BLD AUTO: 37.5 % (ref 35–47)
HGB BLD-MCNC: 11.5 G/DL (ref 11.7–15.7)
IMM GRANULOCYTES # BLD: 0.1 10E3/UL
IMM GRANULOCYTES NFR BLD: 1 %
LYMPHOCYTES # BLD AUTO: 6.9 10E3/UL (ref 0.8–5.3)
LYMPHOCYTES NFR BLD AUTO: 52 %
MCH RBC QN AUTO: 23.5 PG (ref 26.5–33)
MCHC RBC AUTO-ENTMCNC: 30.7 G/DL (ref 31.5–36.5)
MCV RBC AUTO: 77 FL (ref 78–100)
MONOCYTES # BLD AUTO: 0.8 10E3/UL (ref 0–1.3)
MONOCYTES NFR BLD AUTO: 6 %
NEUTROPHILS # BLD AUTO: 5.3 10E3/UL (ref 1.6–8.3)
NEUTROPHILS NFR BLD AUTO: 40 %
NRBC # BLD AUTO: 0 10E3/UL
NRBC BLD AUTO-RTO: 0 /100
PATH REV: ABNORMAL
PLAT MORPH BLD: ABNORMAL
PLATELET # BLD AUTO: 343 10E3/UL (ref 150–450)
RBC # BLD AUTO: 4.9 10E6/UL (ref 3.8–5.2)
RBC MORPH BLD: ABNORMAL
WBC # BLD AUTO: 13.4 10E3/UL (ref 4–11)